# Patient Record
Sex: FEMALE | Race: WHITE
[De-identification: names, ages, dates, MRNs, and addresses within clinical notes are randomized per-mention and may not be internally consistent; named-entity substitution may affect disease eponyms.]

---

## 2020-09-22 ENCOUNTER — HOSPITAL ENCOUNTER (OUTPATIENT)
Dept: HOSPITAL 41 - JD.SDS | Age: 50
Discharge: HOME | End: 2020-09-22
Attending: OBSTETRICS & GYNECOLOGY
Payer: COMMERCIAL

## 2020-09-22 DIAGNOSIS — G47.00: ICD-10-CM

## 2020-09-22 DIAGNOSIS — N87.9: Primary | ICD-10-CM

## 2020-09-22 DIAGNOSIS — N88.8: ICD-10-CM

## 2020-09-22 DIAGNOSIS — N81.2: ICD-10-CM

## 2020-09-22 DIAGNOSIS — Z87.891: ICD-10-CM

## 2020-09-22 DIAGNOSIS — Z01.812: ICD-10-CM

## 2020-09-22 DIAGNOSIS — N73.6: ICD-10-CM

## 2020-09-22 DIAGNOSIS — N39.3: ICD-10-CM

## 2020-09-22 DIAGNOSIS — Z79.899: ICD-10-CM

## 2020-09-22 DIAGNOSIS — N80.0: ICD-10-CM

## 2020-09-22 DIAGNOSIS — Z20.828: ICD-10-CM

## 2020-09-22 DIAGNOSIS — N83.8: ICD-10-CM

## 2020-09-22 DIAGNOSIS — F41.9: ICD-10-CM

## 2020-09-22 DIAGNOSIS — E78.00: ICD-10-CM

## 2020-09-22 DIAGNOSIS — R23.4: ICD-10-CM

## 2020-09-22 PROCEDURE — 86901 BLOOD TYPING SEROLOGIC RH(D): CPT

## 2020-09-22 PROCEDURE — C1771 REP DEV, URINARY, W/SLING: HCPCS

## 2020-09-22 PROCEDURE — 87635 SARS-COV-2 COVID-19 AMP PRB: CPT

## 2020-09-22 PROCEDURE — 81001 URINALYSIS AUTO W/SCOPE: CPT

## 2020-09-22 PROCEDURE — U0002 COVID-19 LAB TEST NON-CDC: HCPCS

## 2020-09-22 PROCEDURE — 85025 COMPLETE CBC W/AUTO DIFF WBC: CPT

## 2020-09-22 PROCEDURE — 36415 COLL VENOUS BLD VENIPUNCTURE: CPT

## 2020-09-22 PROCEDURE — 81025 URINE PREGNANCY TEST: CPT

## 2020-09-22 PROCEDURE — 86900 BLOOD TYPING SEROLOGIC ABO: CPT

## 2020-09-22 PROCEDURE — 86850 RBC ANTIBODY SCREEN: CPT

## 2020-09-22 PROCEDURE — 58262 VAG HYST INCLUDING T/O: CPT

## 2020-09-22 PROCEDURE — 57260 CMBN ANT PST COLPRHY: CPT

## 2020-09-22 PROCEDURE — 57288 REPAIR BLADDER DEFECT: CPT

## 2020-09-22 NOTE — PCM.PREANE
Preanesthetic Assessment





- Procedure


Proposed Procedure: 





Total Vaginal Hysterectomy 





- Anesthesia/Transfusion/Family Hx


Anesthesia History: Prior Anesthesia Reaction


Type of Anesthesia Reaction: Excessive Nausea/Vomiting


Family History of Anesthesia Reaction: No


Transfusion History: No Prior Transfusion(s)





- Review of Systems


General: No Symptoms


Pulmonary: No Symptoms (Former Smoker 5 years ago)


Cardiovascular: No Symptoms


Gastrointestinal: No Symptoms


Neurological: No Symptoms


Other: Reports: Anxiety





- Physical Assessment


NPO Status Date: 20


NPO Status Time: 20:00


Vital Signs: 





                                Last Vital Signs











Temp  36.3 C   20 07:10


 


Pulse  81   20 07:10


 


Resp  16   20 07:10


 


BP  114/74   20 07:10


 


Pulse Ox  96   20 07:10











Weight: 61 kg


ASA Class: 2


Mental Status: Alert & Oriented x3


Airway Class: Mallampati = 2


Dentition: Reports: Normal Dentition


Thyro-Mental Finger Breadths: 2


Mouth Opening Finger Breadths: 3


ROM/Head Extension: Full


Lungs: Clear to Auscultation, Normal Respiratory Effort


Cardiovascular: Regular Rate, Regular Rhythm





- Lab


Values: 





                             Laboratory Last Values











WBC  4.24 K/mm3 (3.98-10.04)   20  10:15    


 


RBC  5.04 M/mm3 (3.98-5.22)   20  10:15    


 


Hgb  15.6 gm/dl (11.2-15.7)   20  10:15    


 


Hct  48.1 % (34.1-44.9)  H  20  10:15    


 


MCV  95.4 fl (79.4-94.8)  H  20  10:15    


 


MCH  31.0 pg (25.6-32.2)   20  10:15    


 


MCHC  32.4 g/dl (32.2-35.5)   20  10:15    


 


RDW Std Deviation  46.4 fL (36.4-46.3)  H  20  10:15    


 


Plt Count  309 K/mm3 (182-369)   20  10:15    


 


MPV  9.2 fl (9.4-12.3)  L  20  10:15    


 


Neut % (Auto)  45.1 % (34.0-71.1)   20  10:15    


 


Lymph % (Auto)  42.0 % (19.3-51.7)   20  10:15    


 


Mono % (Auto)  10.6 % (4.7-12.5)   20  10:15    


 


Eos % (Auto)  1.4  (0.7-5.8)   20  10:15    


 


Baso % (Auto)  0.7 % (0.1-1.2)   20  10:15    


 


Neut # (Auto)  1.91 K/mm3 (1.56-6.13)   20  10:15    


 


Lymph # (Auto)  1.78 K/mm3 (1.18-3.74)   20  10:15    


 


Mono # (Auto)  0.45 K/mm3 (0.24-0.36)  H  20  10:15    


 


Eos # (Auto)  0.06 K/mm3 (0.04-0.36)   20  10:15    


 


Baso # (Auto)  0.03 K/mm3 (0.01-0.08)   20  10:15    


 


Urine Color  Yellow  (Yellow)   20  10:15    


 


Urine Appearance  Clear  (Clear)   20  10:15    


 


Urine pH  7.0  (5.0-8.0)   20  10:15    


 


Ur Specific Gravity  1.015  (1.005-1.030)   20  10:15    


 


Urine Protein  Negative  (Negative)   20  10:15    


 


Urine Glucose (UA)  Negative  (Negative)   20  10:15    


 


Urine Ketones  Negative  (Negative)   20  10:15    


 


Urine Occult Blood  Negative  (Negative)   20  10:15    


 


Urine Nitrite  Negative  (Negative)   20  10:15    


 


Urine Bilirubin  Negative  (Negative)   20  10:15    


 


Urine Urobilinogen  0.2  (0.2-1.0)   20  10:15    


 


Ur Leukocyte Esterase  Negative  (Negative)   20  10:15    


 


Urine RBC  Not seen /hpf (0-5)   20  10:15    


 


Urine WBC  Not seen /hpf (0-5)   20  10:15    


 


Ur Epithelial Cells  0-5 /hpf (0-5)   20  10:15    


 


Urine Bacteria  Not seen /hpf (FEW)   20  10:15    


 


Urine Mucus  Not seen /hpf (FEW)   20  10:15    


 


Urine HCG, Qual  Negative  (NEGATIVE)   20  10:15    


 


SARS-CoV-2 (PCR)  Not detected  (NOT DETECT)   20  10:00    














- Allergies


Allergies/Adverse Reactions: 


                                    Allergies











Allergy/AdvReac Type Severity Reaction Status Date / Time


 


No Known Allergies Allergy   Verified 20 13:14














- Anesthesia Plan


Pre-Op Medication Ordered: Anxiolytic, Other (Scopalamine Patch)





- Acknowledgements


Anesthesia Type Planned: General Anesthesia


Pt an Appropriate Candidate for the Planned Anesthesia: Yes


Alternatives and Risks of Anesthesia Discussed w Pt/Guardian: Yes


Pt/Guardian Understands and Agrees with Anesthesia Plan: Yes





PreAnesthesia Questionnaire


HEENT History: Reports: Impaired Vision, Other (See Below)


Other HEENT History: glasses


Cardiovascular History: Reports: High Cholesterol


Respiratory History: Reports: None


Gastrointestinal History: Reports: None


Genitourinary History: Reports: Urinary Incontinence, Other (See Below)


Other Genitourinary History: cystocele, rectocele


OB/GYN History: Reports: Other (See Below)


Other OB/BYN History: abnormal uterine bleeding, bacterial vaginosis, abdulkadir

rhagia,  x3, tubal, D&C


Musculoskeletal History: Reports: None


Neurological History: Reports: None


Psychiatric History: Reports: None


Endocrine/Metabolic History: Reports: None


Immunologic History: Reports: None


Oncologic (Cancer) History: Reports: None


Dermatologic History: Reports: None





- Past Surgical History


Head Surgeries/Procedures: Reports: None


Cardiovascular Surgical History: Reports: None


Respiratory Surgical History: Reports: None


GI Surgical History: Reports: Appendectomy


Female  Surgical History: Reports: None


Male  Surgical History: Reports: None


Endocrine Surgical History: Reports: None


Neurological Surgical History: Reports: Discectomy, Lumbar Spine


Musculoskeletal Surgical History: Reports: None


Oncologic Surgical History: Reports: None


Dermatological Surgical History: Reports: None





- SUBSTANCE USE


Smoking Status *Q: Former Smoker


Days Per Week of Alcohol Use: 4


Number of Drinks Per Day: 1


Total Drinks Per Week: 4


Recreational Drug Use History: No





- HOME MEDS


Home Medications: 


                                    Home Meds





Aswagandha 1 tab PO DAILY 20 [History]


Cholecalciferol (Vitamin D3) [Vitamin D] 5,000 unit PO DAILY 20 [History]


Fish Oil/Omega-3 Fatty Acids [Fish Oil 1,000 MG] 1 gm PO DAILY 20 

[History]


Glucosamine [Glucosamine Sulfate] 500 mg PO DAILY 20 [History]


Lactobacillus Acidophilus [Probiotic] 1 cap PO DAILY 20 [History]


Lansoprazole [Prevacid] 30 mg PO DAILY 20 [History]


Multivitamin 1 tab PO DAILY 20 [History]


Zeaxanthin 1 dose PO DAILY 20 [History]


clonazePAM [Clonazepam] 0.5 mg PO BEDTIME PRN 20 [History]











- CURRENT (IN HOUSE) MEDS


Current Meds: 





                               Current Medications





Lactated Ringer's (Ringers, Lactated)  1,000 mls @ 125 mls/hr IV ASDIRECTED BIJU


   Stop: 20 23:00


   Last Admin: 20 07:20 Dose:  125 mls/hr


   Documented by: 


Lidocaine/Sodium Bicarbonate (Buffered Lidocaine 1% In Ns 8.4%)  0.25 ml IDERM 

ONETIME PRN


   PRN Reason: Prior to IV Start


   Stop: 20 18:00


   Last Admin: 20 07:19 Dose:  0.25 ml


   Documented by: 


Scopolamine (Transderm-Scop)  1.5 mg TOP ONETIME ONE


   Stop: 20 07:33


Sodium Chloride (Saline Flush)  10 ml FLUSH ASDIRECTED PRN


   PRN Reason: Keep Vein Open


   Stop: 20 18:00





Discontinued Medications





Cefazolin Sodium (Ancef) Confirm Administered Dose 2 gm .ROUTE .STK-MED ONE


   Stop: 20 07:13


Dexamethasone (Dexamethasone) Confirm Administered Dose 20 mg .ROUTE .STK-MED 

ONE


   Stop: 20 07:14


Fentanyl (Sublimaze) Confirm Administered Dose 250 mcg .ROUTE .STK-MED ONE


   Stop: 20 07:13


Lidocaine HCl (Xylocaine-Mpf 1%) Confirm Administered Dose 4 mls @ as directed 

.ROUTE .STK-MED ONE


   Stop: 20 07:13


Lactated Ringer's (Ringers, Lactated) Confirm Administered Dose 1,000 mls @ as 

directed .ROUTE .STK-MED ONE


   Stop: 20 07:13


Lidocaine/Epinephrine (Xylocaine 1% With Epinephrine 1:100,000) Confirm 

Administered Dose 20 ml .ROUTE .STK-MED ONE


   Stop: 20 07:16


Midazolam HCl (Versed 1 Mg/Ml) Confirm Administered Dose 2 mg .ROUTE .STK-MED 

ONE


   Stop: 20 07:13


Ondansetron HCl (Zofran) Confirm Administered Dose 4 mg .ROUTE .STK-MED ONE


   Stop: 20 07:13


Propofol (Diprivan  20 Ml) Confirm Administered Dose 400 mg .ROUTE .STK-MED ONE


   Stop: 20 07:13


Rocuronium Bromide (Zemuron) Confirm Administered Dose 50 mg .ROUTE .STK-MED ONE


   Stop: 20 07:13


Sodium Chloride (Normal Saline) Confirm Administered Dose 50 ml .ROUTE .STK-MED 

ONE


   Stop: 20 07:16

## 2020-09-22 NOTE — PCM.POSTAN
POST ANESTHESIA ASSESSMENT





- MENTAL STATUS


Mental Status: Alert, Oriented





- VITAL SIGNS


Vital Signs: 


                                Last Vital Signs











Temp  36.3 C   09/22/20 07:10


 


Pulse  81   09/22/20 07:10


 


Resp  16   09/22/20 07:10


 


BP  114/74   09/22/20 07:10


 


Pulse Ox  96   09/22/20 07:10








0944


91


13


97.4F


94/50





- RESPIRATORY


Respiratory Status: Respiratory Rate WNL, Airway Patent, O2 Saturation Stable, 

Supplemental Oxygen





- CARDIOVASCULAR


CV Status: Pulse Rate WNL, Blood Pressure Stable





- GASTROINTESTINAL


GI Status: No Symptoms





- PAIN


Pain Score: 0





- POST OP HYDRATION


Hydration Status: Adequate & Stable

## 2020-09-22 NOTE — PCM.OPNOTE
- General Post-Op/Procedure Note


Date of Surgery/Procedure: 09/22/20


Operative Procedure(s): Total vaginal hysterectomy with bilateral salpingectomy,

anterior and posterior vaginal repair with perineoplasty, subfascial mid 

urethral sling


Findings: 





2 cystocele, grade 2 rectocele, grade 1-2 uterine descensus.  Findings on simple

office cystometric's consistent with stress urinary incontinence.  Ovaries 

appeared normal.  There was a benign-appearing simple cyst on right ovary.


Pre Op Diagnosis: 1.  Stress urinary incontinence.  2.  Menorrhagia.  3.  

Uterine fibroids.  4.  Cystocele.  5.  Rectocele


Post-Op Diagnosis: Same


Anesthesia Technique: General ET Tube


Other Anesthesia Type: Lidocaine quarter percent with epinephrineapproximately 

30 to 35 mL.


Primary Surgeon: Bayron Mott


Secondary Surgeon: Duane Sanders


Anesthesia Provider: Leanna Hawkins


Assistant: Ariana Andrade


Reason Assistant Was Necessary: 





Assistance, retraction, patient safety, quality of care.


Pathology: 





Uterus, bilateral tubal segments.


Fluid Replacement, Intraop: 1,800


Output, Urine Amount: 80


EBL in mLs: 100


Drain/Tube Comments:: Straight catheter used to drain and fill bladder during 

case.


Complications: None


Condition: Good


Free Text/Narrative:: 


Surgery duration: 1 hour 18 minutes








Procedure: The patient was placed in supine position on the operating table. She

received 2 g of Ancef preoperatively for infection prophylaxis and had 

sequential compression stockings in place for DVT prophylaxis. General 

endotracheal anesthesia was accomplished. After positioning, and adequate prep 

and drape, the procedure was then performed. Sterile speculum was placed in the 

vagina and cervix was visualized. Cervix was injected with [lidocaine quarter 

percent with epinephrine]. [20 cc] used. A full circumference incision was made 

in the cervical epithelium. The bladder was pushed well back off cervix. 

Posterior cul-de-sac was then entered sharply without problems. Left uterosacral

was crossclamped with a Enseal vessel closure system. The left uterosacral and 

then the right uterosacral ligament pedicles were developed using the Enseal 

system. The anterior cul-de-sac was then entered without problems and the 

uterine vasculature, cardinal ligament and broad ligament then developed using 

Enseal vessel closure system. The uterus was inverted at this time and upper 

broad ligament fallopian tube pedicles were crossclamped with Kallie clamps. 

Specimen was totally removed. 





Left and right fallopian tube was normal in appearance secondary. Using the 

vessel closure system each of the tubes was then removed and sent with the 

specimen.  The patient was found to be hemostatically intact at this time. 





Anterior vaginal repair was performed in the routine fashion. The midline 

epithelium was grasped approximately 2 cm from the urethral meatus. The 

epithelium at the vaginal cuff after removal of the uterus was grasped 2 with 

Allis clamps. The epithelium was infiltrated with lidocaine quarter percent with

epinephrine approximately 10 mL. Midline incision was made in the epithelium was

dissected off the underlying support tissue. The vesicovaginal fascia that 

remained was then reapproximated midline with approximately 6 V type sutures of 

0 Monocryl. This effectively reduced the cystocele. Excess epithelium was 

removed on each side sharply and the epithelium was reapproximated using 3-0 

Monocryl a short running suture. At this time the vaginal cuff was closed ante

riorly to posteriorly.





Patient's bladder was again drained with a red rubber catheter. 80 mL normal 

urine was removed. The epithelium overlying the urethra was grasped 

approximately 1 cm from the urethral meatus and approximately 2 cm cephalad from

there with Allis clamps. The area of the skin overlying the medial aspect of the

obturator foramen on each side just posterior to the origin the abductor longus 

muscle was marked with a marking pen. These 2 areas and the sub-fascial layer of

the vaginal were then infiltrated with lidocaine quarter percent with 

epinephrinetotal of approximately 10 mL was used. incisions made in the 

epithelium overlying the urethra and 2 small stab wounds 3 mm in length were 

made in the 2 areas of the panty line of the patient.





The subfascial planes and adequately dissected bilaterally to allow placement of

the mesh. The helical adapter was then placed through the obturator on patient's

left side brought out through the vaginal subfascial plane. Mesh was attached to

it and then was pulled back through the obturator foramen. Same was done on the 

right side. Mesh was then snugged up to the urethra. Dilator 15 mm in diameter 

was used as a spacer to place the mesh in a tension-free position. At this point

the mesh was cut off at the skin surface and the dilator was removed. The 

midline epithelium was closed with a short running suture of 3-0 Monocryl.  At 

the very end of the entire procedure the skin incisions were closed with 

Dermabond skin glue.  At the same time the bladder was filled with approximately

20/40 cc of normal saline to facilitate voiding and therefore discharged home. 





Posterior repair was then performed.The uppermost portion of the rectocele was 

identified and was grasped midline with an Allis clamp. The introital area was 

grasped at approximately the 4:00 and 8:00 positions at the junction of the 

vaginal and vulvar epithelium. The area of epithelium was then infiltrated with 

lidocaine quarter percent with epinephrine. A valerio-shaped piece of epithelium

was removed from the posterior introital and perineal area. The vaginal 

epithelium was then undermined superiorly to the top of the rectocele. Was then 

incised midline. With sharp and blunt dissection the epithelium was then 

dissected off of the underlying vesicovaginal fascia. At this point 

approximately 4 sutures of 0 Monocryl were placed to reapproximate the lateral 

supportive tissue midline and reduce the rectocele. The excess epithelium was 

then excised and the epithelium overlying the rectocele repair was then 

reapproximated with a running suture of 3-0 Monocryl. Perineoplasty was 

performed using 3 V-type stitches of 0 Monocryl. This lengthened the perineal 

body and the vagina. Also change the angle of vagina. At this point the 

epithelium over the perineum was closed in an episiotomy fashion using the 3-0 

Monocryl suture.





Patient was returned to supine position and awakened from general endotracheal 

anesthesia. She tolerated the procedure was then left the operating room in 

satisfactory condition. 













































































Procedure:  The patient is taken to the operating room and placed in a supine 

position on the operating table. She received 2 g of Ancef preoperatively for 

infection prophylaxis. She had sequential compression stockings in place for DVT

prophylaxis. Patient was administered general endotracheal anesthesia. She was 

placed in a dorsal lithotomy position and prepped and draped in the usual 

fashion.





Anterior vaginal repair was performed. Patient had emptied her bladder on call 

to the OR. Weighted speculum was placed in the vagina and the uppermost portion 

of the cystocele was identified. Two Allis clamps was placed at that uppermost 

point at a position approximately 1-1/2 cm lateral to the midline  A second 

Allis clamp was placed approximately 2 cm from the urethral meatus. The areas 

and infiltrated with lidocaine quarter percent with epinephrine. Approximately 8

mL was used. The 2 lateral Allis clamps were retracted and an epithelial 

incision was made between the 2 clamps. A midline incision was then made with a 

Metzenbaum scissors. The overlying epithelium was then dissected off of the 

underlying vesicovaginal fascia. This all to lateral which when approximately 

midline was felt to be adequate to reduce the cystocele. When this was done 

approximately 4 trapezoidal shaped sutures of 0 Monocryl were then placed 

reapproximating the lateral supportive tissue midline and reducing the 

rectocele. At this point the excess epithelium bilaterally was removed and the 

epithelium was closed in a running fashion with 2 short segments to decrease 

likelihood of shortening of the vagina.





Rectocele was then performed. The uppermost portion of the rectocele was 

identified and was grasped midline with an Allis clamp. The introital area was 

grasped at approximately the 4:00 and 8:00 positions at the junction of the 

vaginal and vulvar epithelium. The area of epithelium was then infiltrated with 

lidocaine quarter percent with epinephrine. A valerio-shaped piece of epithelium

was removed from the posterior introital and perineal area. The vaginal 

epithelium was then undermined superiorly to the top of the rectocele. Was then 

incised midline. With sharp and blunt dissection the epithelium was then 

dissected off of the underlying vesicovaginal fascia. At this point 

approximately 5 sutures of 0 Monocryl were placed to reapproximate the lateral 

supportive tissue midline and reduce the rectocele. The excess epithelium was 

then excised and the epithelium overlying the rectocele repair was then 

reapproximated with a running suture of 3-0 Monocryl.





Perineoplasty was then performed with approximately 4 V-shaped stitches of 0 

Monocryl in placed to reapproximate the lateral tissue midline, rebuild the 

perineum about 1 cm and the vagina approximately 1 cm. The epithelium of the 

introitus and perineal body was then reapproximated using 3-0 Monocryl in an 

episiotomy repair fashion. At this time sponge, instrument and needle counts 

were correct. The patient was returned to supine position and was discharged 

from the operating room in good condition.

## 2020-09-22 NOTE — [id number]
Post Anesthesia Note





- EVALUATION WITHIN 48HRS OF ANESTHETIC


Vital Signs in Normal Range: Yes


Patient Participated in Evaluation: Yes


Respiratory Function Stable: Yes


Airway Patent: Yes


Cardiovascular Function Stable: Yes


Hydration Status Stable: Yes


Pain Control Satisfactory: Yes


Nausea and Vomiting Control Satisfactory: Yes


Mental Status Recovered: Yes


Vital Signs: 


                                Last Vital Signs











Temp  36.5 C   09/22/20 11:15


 


Pulse  85   09/22/20 11:15


 


Resp  16   09/22/20 11:15


 


BP  116/81   09/22/20 11:15


 


Pulse Ox  99   09/22/20 11:15